# Patient Record
Sex: MALE | Race: WHITE | NOT HISPANIC OR LATINO | URBAN - METROPOLITAN AREA
[De-identification: names, ages, dates, MRNs, and addresses within clinical notes are randomized per-mention and may not be internally consistent; named-entity substitution may affect disease eponyms.]

---

## 2020-12-14 ENCOUNTER — OFFICE VISIT (OUTPATIENT)
Dept: URBAN - METROPOLITAN AREA CLINIC 92 | Facility: CLINIC | Age: 30
End: 2020-12-14
Payer: COMMERCIAL

## 2020-12-14 VITALS
DIASTOLIC BLOOD PRESSURE: 76 MMHG | WEIGHT: 168 LBS | BODY MASS INDEX: 20.89 KG/M2 | HEIGHT: 75 IN | SYSTOLIC BLOOD PRESSURE: 114 MMHG | TEMPERATURE: 94.5 F | HEART RATE: 78 BPM

## 2020-12-14 DIAGNOSIS — K51.20 ULCERATIVE PROCTITIS: ICD-10-CM

## 2020-12-14 PROBLEM — 52231000 ULCERATIVE PROCTITIS: Status: ACTIVE | Noted: 2020-12-14

## 2020-12-14 PROCEDURE — G8420 CALC BMI NORM PARAMETERS: HCPCS | Performed by: INTERNAL MEDICINE

## 2020-12-14 PROCEDURE — 99203 OFFICE O/P NEW LOW 30 MIN: CPT | Performed by: INTERNAL MEDICINE

## 2020-12-14 PROCEDURE — G8427 DOCREV CUR MEDS BY ELIG CLIN: HCPCS | Performed by: INTERNAL MEDICINE

## 2020-12-14 PROCEDURE — G8482 FLU IMMUNIZE ORDER/ADMIN: HCPCS | Performed by: INTERNAL MEDICINE

## 2020-12-14 PROCEDURE — 1036F TOBACCO NON-USER: CPT | Performed by: INTERNAL MEDICINE

## 2020-12-14 RX ORDER — SULFASALAZINE 500 MG/1
2 TABLET TABLET ORAL TWICE DAILY
Qty: 120 | Refills: 11 | OUTPATIENT
Start: 2020-12-14 | End: 2021-12-09

## 2020-12-14 NOTE — HPI-TODAY'S VISIT:
This is a 30-year-old  male presents today to establish care.  He recently relocated to the [place] area from [place].  He has ulcerative proctitis.  He was diagnosed approximately 11 years ago his initial symptoms were bloody diarrhea and abdominal pain.  He was diagnosed via colonoscopy with biopsy.  He has been on sulfasalazine and occasionally mesalamine enemas.  He has had very minimal flares.  He has no history of steroid use.  There is no history of hospitalization.  His last colonoscopy was earlier this year as well as blood work.  He is currently in his usual state of health his bowel movements are normal there is no blood in stool.

## 2023-07-10 ENCOUNTER — OFFICE VISIT (OUTPATIENT)
Dept: URBAN - METROPOLITAN AREA CLINIC 92 | Facility: CLINIC | Age: 33
End: 2023-07-10
Payer: COMMERCIAL

## 2023-07-10 ENCOUNTER — DASHBOARD ENCOUNTERS (OUTPATIENT)
Age: 33
End: 2023-07-10

## 2023-07-10 VITALS
SYSTOLIC BLOOD PRESSURE: 104 MMHG | HEART RATE: 71 BPM | TEMPERATURE: 97.4 F | BODY MASS INDEX: 24.37 KG/M2 | HEIGHT: 75 IN | DIASTOLIC BLOOD PRESSURE: 68 MMHG | WEIGHT: 196 LBS

## 2023-07-10 DIAGNOSIS — K51.20 ULCERATIVE PROCTITIS: ICD-10-CM

## 2023-07-10 PROCEDURE — 99214 OFFICE O/P EST MOD 30 MIN: CPT | Performed by: INTERNAL MEDICINE

## 2023-07-10 RX ORDER — MESALAMINE 1000 MG/1
1 SUPPOSITORY AT BEDTIME SUPPOSITORY RECTAL ONCE A DAY
Qty: 30 | Refills: 3 | OUTPATIENT
Start: 2023-07-10 | End: 2023-11-06

## 2023-07-10 NOTE — HPI-TODAY'S VISIT:
This is a 33-year-old male presents today for follow-up.  He is doing quite well.  He has had a flare in symptoms less than once a year.  He has 3 bowel movements per day with no blood in stool.  When he does have a flare he will have about 6 bowel movements per day with some mucus and urgency.  His labs are up-to-date with his primary care doctor.

## 2023-08-07 ENCOUNTER — ERX REFILL RESPONSE (OUTPATIENT)
Dept: URBAN - METROPOLITAN AREA CLINIC 92 | Facility: CLINIC | Age: 33
End: 2023-08-07

## 2023-08-07 RX ORDER — MESALAMINE 1000 MG/1
1 SUPPOSITORY AT BEDTIME RECTAL ONCE A DAY 30 DAYS SUPPOSITORY RECTAL
Qty: 30 SUPPOSITORY | Refills: 3 | OUTPATIENT

## 2023-08-07 RX ORDER — MESALAMINE 1000 MG/1
1 SUPPOSITORY AT BEDTIME SUPPOSITORY RECTAL ONCE A DAY
Qty: 30 | Refills: 3 | OUTPATIENT

## 2023-08-15 ENCOUNTER — CLAIMS CREATED FROM THE CLAIM WINDOW (OUTPATIENT)
Dept: URBAN - METROPOLITAN AREA SURGERY CENTER 16 | Facility: SURGERY CENTER | Age: 33
End: 2023-08-15

## 2023-08-15 ENCOUNTER — CLAIMS CREATED FROM THE CLAIM WINDOW (OUTPATIENT)
Dept: URBAN - METROPOLITAN AREA SURGERY CENTER 16 | Facility: SURGERY CENTER | Age: 33
End: 2023-08-15
Payer: COMMERCIAL

## 2023-08-15 DIAGNOSIS — Z09 ENCNTR FOR F/U EXAM AFT TRTMT FOR COND OTH THAN MALIG NEOPLM: ICD-10-CM

## 2023-08-15 DIAGNOSIS — K51.20 ULCERATIVE PROCTITIS: ICD-10-CM

## 2023-08-15 PROCEDURE — G8907 PT DOC NO EVENTS ON DISCHARG: HCPCS | Performed by: INTERNAL MEDICINE

## 2023-08-15 PROCEDURE — 45380 COLONOSCOPY AND BIOPSY: CPT | Performed by: INTERNAL MEDICINE
